# Patient Record
Sex: MALE | Race: WHITE | NOT HISPANIC OR LATINO | URBAN - METROPOLITAN AREA
[De-identification: names, ages, dates, MRNs, and addresses within clinical notes are randomized per-mention and may not be internally consistent; named-entity substitution may affect disease eponyms.]

---

## 2021-06-23 ENCOUNTER — TRANSFERRED RECORDS (OUTPATIENT)
Dept: HEALTH INFORMATION MANAGEMENT | Facility: CLINIC | Age: 71
End: 2021-06-23

## 2021-09-02 ENCOUNTER — TRANSFERRED RECORDS (OUTPATIENT)
Dept: HEALTH INFORMATION MANAGEMENT | Facility: CLINIC | Age: 71
End: 2021-09-02

## 2021-12-08 ENCOUNTER — TRANSFERRED RECORDS (OUTPATIENT)
Dept: HEALTH INFORMATION MANAGEMENT | Facility: CLINIC | Age: 71
End: 2021-12-08

## 2022-01-26 ENCOUNTER — TRANSFERRED RECORDS (OUTPATIENT)
Dept: HEALTH INFORMATION MANAGEMENT | Facility: CLINIC | Age: 72
End: 2022-01-26
Payer: COMMERCIAL

## 2022-01-27 ENCOUNTER — TRANSFERRED RECORDS (OUTPATIENT)
Dept: HEALTH INFORMATION MANAGEMENT | Facility: CLINIC | Age: 72
End: 2022-01-27
Payer: COMMERCIAL

## 2022-01-28 NOTE — TELEPHONE ENCOUNTER
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

## 2022-02-10 ENCOUNTER — TELEPHONE (OUTPATIENT)
Dept: OPHTHALMOLOGY | Facility: CLINIC | Age: 72
End: 2022-02-10
Payer: COMMERCIAL

## 2022-02-10 NOTE — TELEPHONE ENCOUNTER
Spoke with patient regarding scheduling for a sooner appointment that opened up on 2/14/2022. Patient was able to reschedule as offered. Patient is aware of time, date and location.-Per Patient

## 2022-02-11 NOTE — TELEPHONE ENCOUNTER
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

## 2022-02-14 ENCOUNTER — TELEPHONE (OUTPATIENT)
Dept: OPHTHALMOLOGY | Facility: CLINIC | Age: 72
End: 2022-02-14

## 2022-02-14 ENCOUNTER — OFFICE VISIT (OUTPATIENT)
Dept: OPHTHALMOLOGY | Facility: CLINIC | Age: 72
End: 2022-02-14
Payer: COMMERCIAL

## 2022-02-14 ENCOUNTER — PRE VISIT (OUTPATIENT)
Dept: OPHTHALMOLOGY | Facility: CLINIC | Age: 72
End: 2022-02-14

## 2022-02-14 VITALS — WEIGHT: 180 LBS | HEIGHT: 66 IN | BODY MASS INDEX: 28.93 KG/M2

## 2022-02-14 DIAGNOSIS — Q10.3 CANTHAL DYSTOPIA: Primary | ICD-10-CM

## 2022-02-14 PROCEDURE — 99204 OFFICE O/P NEW MOD 45 MIN: CPT | Mod: GC | Performed by: OPHTHALMOLOGY

## 2022-02-14 RX ORDER — MOXIFLOXACIN 5 MG/ML
1 SOLUTION/ DROPS OPHTHALMIC 4 TIMES DAILY
Qty: 5 ML | Refills: 1 | Status: SHIPPED | OUTPATIENT
Start: 2022-02-14

## 2022-02-14 RX ORDER — TIMOLOL MALEATE 5 MG/ML
SOLUTION/ DROPS OPHTHALMIC
COMMUNITY
Start: 2022-01-26

## 2022-02-14 ASSESSMENT — TONOMETRY
IOP_METHOD: TONOPEN
OD_IOP_MMHG: PROS
OS_IOP_MMHG: 17

## 2022-02-14 ASSESSMENT — SLIT LAMP EXAM - LIDS
COMMENTS: NORMAL
COMMENTS: LATERAL CANTHAL DYSTOPIA

## 2022-02-14 ASSESSMENT — VISUAL ACUITY
OD_CC: PROS
OS_CC+: -1
CORRECTION_TYPE: GLASSES
METHOD: SNELLEN - LINEAR
OS_CC: 20/20

## 2022-02-14 ASSESSMENT — CONF VISUAL FIELD
OS_NORMAL: 1
OD_INFERIOR_TEMPORAL_RESTRICTION: 1
OD_SUPERIOR_TEMPORAL_RESTRICTION: 1
OD_INFERIOR_NASAL_RESTRICTION: 1
OD_SUPERIOR_NASAL_RESTRICTION: 1
METHOD: COUNTING FINGERS

## 2022-02-14 ASSESSMENT — EXTERNAL EXAM - LEFT EYE: OS_EXAM: NORMAL

## 2022-02-14 ASSESSMENT — MIFFLIN-ST. JEOR: SCORE: 1524.22

## 2022-02-14 NOTE — TELEPHONE ENCOUNTER
Met with patient to schedule surgery with Dr. Rio Burnham.    Surgery was scheduled on 03/16 at [facility]  Patient will have H&P at [facility]      Patient is aware a COVID-19 test is needed before their procedure. The test should be with-in 4 days of their procedure.   Test Details: Patient will schedule at [facility]     Post-Op visit was scheduled on 04/04  Patient is aware a / is needed day of surgery.   Surgery packet was given, patient has my direct contact information for any further questions.

## 2022-02-14 NOTE — PROGRESS NOTES
Chief Complaints and History of Present Illnesses   Patient presents with     Consult For     S/p Trouble with prosthetic staying in socket.     Chief Complaint(s) and History of Present Illness(es)     Consult For     In right eye.  Associated symptoms include discharge and tearing.    Negative for eye pain and itching.  Pain was noted as 0/10. Additional   comments: S/p Trouble with prosthetic staying in socket.              Comments     S/P Right lateral cathopexy with lateral Myocutneous flap right eye   Jan.2008. Patient reports that the  past month trouble prosthetic right   eye staying in.   Tried to wipe some mucus away right eye and it came out. Could not get   back in.   Had a appt with [facility] for glaucoma/Cataracts left eye and he tried to   help get prosthetic back in right eye and unable.  Went to have prosthetic check and they had  to grind the prothetic down   more to get it in. It was suggested that evaluation be done to see if   something can be done to improve things so that a better fitting eye could   be make that might stay in better.     Sonia Milligan, COT COT 10:50 AM February 14, 2022                      Assessment & Plan     Varinder Fernandez is a 71 year old male with the following diagnoses:   1. Canthal dystopia         S/P Right lateral cathopexy with lateral Myocutneous flap right eye   Jan.2008.  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 His prosthesis fell out 2 months ago and he was unable to replace his prosthesis due to lid position.  - significant white mucopuruent discharge in right socket.  - start vigamox QID right eye until sensitivities return    Plan:  - vigamox QID right eye  - right lateral canthoplasty + buccal mucosal graft to recreate inferior fornix/lateral canthus/superolateral fornix      Silke Negron MD  Oculoplastics Fellow    Attending Physician Attestation:  I have seen and examined this patient with the  fellow .  I have confirmed and edited as necessary the chief complaint(s), history of present illness, review of systems, relevant history, and examination findings as documented by others.  I have personally reviewed the relevant tests, images, and reports as documented above.  I have confirmed and edited as necessary the assessment and plan and agree with this note.    - Rio Burnham MD 11:18 AM 2/14/2022    Today with Varinder Fernandez, I reviewed the indications, risks, benefits, and alternatives of the proposed surgical procedure including, but not limited to, failure obtain the desired result  and need for additional surgery, bleeding, infection, loss of vision, loss of the eye, and the remote possibility of permanent damage to any organ system or death with the use of anesthesia.  I provided multiple opportunities for the questions, answered all questions to the best of my ability, and confirmed that my answers and my discussion were understood.     - Rio Burnham MD 11:18 AM 2/14/2022

## 2022-02-14 NOTE — NURSING NOTE
Chief Complaints and History of Present Illnesses   Patient presents with     Consult For     S/p Trouble with prosthetic staying in socket.     Chief Complaint(s) and History of Present Illness(es)     Consult For     Laterality: right eye    Associated symptoms: discharge and tearing.  Negative for eye pain and itching    Pain scale: 0/10    Comments: S/p Trouble with prosthetic staying in socket.              Comments     S/P Right lateral cathopexy with lateral Myocutneous flap right eye Jan.2008. Patient reports that the  past month trouble prosthetic right eye staying in.   Tried to wipe some mucus away right eye and it came out. Could not get back in.   Had a appt with [employer] for glaucoma/Cataracts left eye and he tried to help get prosthetic back in right eye and unable.  Went to have prosthetic check and they had  to grind the prothetic down more to get it in. It was suggested that evaluation be done to see if something can be done to improve things so that a better fitting eye could be make that might stay in better.     Sonia Milligan, COT COT 10:50 AM February 14, 2022

## 2022-02-15 DIAGNOSIS — Z11.59 ENCOUNTER FOR SCREENING FOR OTHER VIRAL DISEASES: Primary | ICD-10-CM

## 2022-02-18 ENCOUNTER — PRE VISIT (OUTPATIENT)
Dept: OPHTHALMOLOGY | Facility: CLINIC | Age: 72
End: 2022-02-18

## 2022-03-15 ENCOUNTER — ANESTHESIA EVENT (OUTPATIENT)
Dept: SURGERY | Facility: AMBULATORY SURGERY CENTER | Age: 72
End: 2022-03-15
Payer: COMMERCIAL

## 2022-03-15 RX ORDER — FENTANYL CITRATE 50 UG/ML
25 INJECTION, SOLUTION INTRAMUSCULAR; INTRAVENOUS
Status: CANCELLED | OUTPATIENT
Start: 2022-03-15

## 2022-03-16 ENCOUNTER — ANESTHESIA (OUTPATIENT)
Dept: SURGERY | Facility: AMBULATORY SURGERY CENTER | Age: 72
End: 2022-03-16
Payer: COMMERCIAL

## 2022-03-16 ENCOUNTER — HOSPITAL ENCOUNTER (OUTPATIENT)
Facility: AMBULATORY SURGERY CENTER | Age: 72
Discharge: HOME | End: 2022-03-16
Attending: OPHTHALMOLOGY
Payer: COMMERCIAL

## 2022-03-16 VITALS
OXYGEN SATURATION: 97 % | HEART RATE: 75 BPM | DIASTOLIC BLOOD PRESSURE: 79 MMHG | BODY MASS INDEX: 27.78 KG/M2 | RESPIRATION RATE: 16 BRPM | WEIGHT: 177 LBS | TEMPERATURE: 96.9 F | SYSTOLIC BLOOD PRESSURE: 118 MMHG | HEIGHT: 67 IN

## 2022-03-16 DIAGNOSIS — Q10.3 CANTHAL DYSTOPIA: ICD-10-CM

## 2022-03-16 DIAGNOSIS — H11.231 SYMBLEPHARON OF RIGHT EYE: Primary | ICD-10-CM

## 2022-03-16 PROCEDURE — 67875 CLOSURE OF EYELID BY SUTURE: CPT | Mod: RT | Performed by: OPHTHALMOLOGY

## 2022-03-16 PROCEDURE — 68328 REVISE/GRAFT EYELID LINING: CPT | Mod: RT | Performed by: OPHTHALMOLOGY

## 2022-03-16 PROCEDURE — 21282 LATERAL CANTHOPEXY: CPT | Mod: RT | Performed by: OPHTHALMOLOGY

## 2022-03-16 PROCEDURE — 67875 CLOSURE OF EYELID BY SUTURE: CPT | Mod: RT

## 2022-03-16 PROCEDURE — 68328 REVISE/GRAFT EYELID LINING: CPT | Mod: RT

## 2022-03-16 PROCEDURE — 21282 LATERAL CANTHOPEXY: CPT | Mod: RT

## 2022-03-16 RX ORDER — FENTANYL CITRATE 50 UG/ML
25 INJECTION, SOLUTION INTRAMUSCULAR; INTRAVENOUS EVERY 5 MIN PRN
Status: DISCONTINUED | OUTPATIENT
Start: 2022-03-16 | End: 2022-03-18 | Stop reason: HOSPADM

## 2022-03-16 RX ORDER — OXYCODONE HYDROCHLORIDE 5 MG/1
5 TABLET ORAL EVERY 6 HOURS PRN
Qty: 12 TABLET | Refills: 0 | Status: SHIPPED | OUTPATIENT
Start: 2022-03-16 | End: 2022-03-19

## 2022-03-16 RX ORDER — LIDOCAINE 40 MG/G
CREAM TOPICAL
Status: DISCONTINUED | OUTPATIENT
Start: 2022-03-16 | End: 2022-03-16 | Stop reason: HOSPADM

## 2022-03-16 RX ORDER — CHLORHEXIDINE GLUCONATE ORAL RINSE 1.2 MG/ML
15 SOLUTION DENTAL 2 TIMES DAILY
Qty: 200 ML | Refills: 0 | Status: SHIPPED | OUTPATIENT
Start: 2022-03-16

## 2022-03-16 RX ORDER — SODIUM CHLORIDE, SODIUM LACTATE, POTASSIUM CHLORIDE, CALCIUM CHLORIDE 600; 310; 30; 20 MG/100ML; MG/100ML; MG/100ML; MG/100ML
INJECTION, SOLUTION INTRAVENOUS CONTINUOUS
Status: DISCONTINUED | OUTPATIENT
Start: 2022-03-16 | End: 2022-03-16 | Stop reason: HOSPADM

## 2022-03-16 RX ORDER — CHLORHEXIDINE GLUCONATE ORAL RINSE 1.2 MG/ML
SOLUTION DENTAL PRN
Status: DISCONTINUED | OUTPATIENT
Start: 2022-03-16 | End: 2022-03-16 | Stop reason: HOSPADM

## 2022-03-16 RX ORDER — SODIUM CHLORIDE, SODIUM LACTATE, POTASSIUM CHLORIDE, CALCIUM CHLORIDE 600; 310; 30; 20 MG/100ML; MG/100ML; MG/100ML; MG/100ML
INJECTION, SOLUTION INTRAVENOUS CONTINUOUS PRN
Status: DISCONTINUED | OUTPATIENT
Start: 2022-03-16 | End: 2022-03-16

## 2022-03-16 RX ORDER — FENTANYL CITRATE 50 UG/ML
INJECTION, SOLUTION INTRAMUSCULAR; INTRAVENOUS PRN
Status: DISCONTINUED | OUTPATIENT
Start: 2022-03-16 | End: 2022-03-16

## 2022-03-16 RX ORDER — ACETAMINOPHEN 325 MG/1
975 TABLET ORAL ONCE
Status: DISCONTINUED | OUTPATIENT
Start: 2022-03-16 | End: 2022-03-16 | Stop reason: HOSPADM

## 2022-03-16 RX ORDER — PROPOFOL 10 MG/ML
INJECTION, EMULSION INTRAVENOUS PRN
Status: DISCONTINUED | OUTPATIENT
Start: 2022-03-16 | End: 2022-03-16

## 2022-03-16 RX ORDER — LIDOCAINE HYDROCHLORIDE 20 MG/ML
INJECTION, SOLUTION INFILTRATION; PERINEURAL PRN
Status: DISCONTINUED | OUTPATIENT
Start: 2022-03-16 | End: 2022-03-16

## 2022-03-16 RX ORDER — PROPOFOL 10 MG/ML
INJECTION, EMULSION INTRAVENOUS CONTINUOUS PRN
Status: DISCONTINUED | OUTPATIENT
Start: 2022-03-16 | End: 2022-03-16

## 2022-03-16 RX ORDER — MEPERIDINE HYDROCHLORIDE 25 MG/ML
12.5 INJECTION INTRAMUSCULAR; INTRAVENOUS; SUBCUTANEOUS
Status: DISCONTINUED | OUTPATIENT
Start: 2022-03-16 | End: 2022-03-18 | Stop reason: HOSPADM

## 2022-03-16 RX ORDER — SODIUM CHLORIDE, SODIUM LACTATE, POTASSIUM CHLORIDE, CALCIUM CHLORIDE 600; 310; 30; 20 MG/100ML; MG/100ML; MG/100ML; MG/100ML
INJECTION, SOLUTION INTRAVENOUS CONTINUOUS
Status: DISCONTINUED | OUTPATIENT
Start: 2022-03-16 | End: 2022-03-18 | Stop reason: HOSPADM

## 2022-03-16 RX ORDER — ONDANSETRON 2 MG/ML
INJECTION INTRAMUSCULAR; INTRAVENOUS PRN
Status: DISCONTINUED | OUTPATIENT
Start: 2022-03-16 | End: 2022-03-16

## 2022-03-16 RX ORDER — LIDOCAINE HYDROCHLORIDE AND EPINEPHRINE 10; 10 MG/ML; UG/ML
INJECTION, SOLUTION INFILTRATION; PERINEURAL PRN
Status: DISCONTINUED | OUTPATIENT
Start: 2022-03-16 | End: 2022-03-16 | Stop reason: HOSPADM

## 2022-03-16 RX ORDER — DEXAMETHASONE SODIUM PHOSPHATE 4 MG/ML
INJECTION, SOLUTION INTRA-ARTICULAR; INTRALESIONAL; INTRAMUSCULAR; INTRAVENOUS; SOFT TISSUE PRN
Status: DISCONTINUED | OUTPATIENT
Start: 2022-03-16 | End: 2022-03-16

## 2022-03-16 RX ORDER — ONDANSETRON 2 MG/ML
4 INJECTION INTRAMUSCULAR; INTRAVENOUS EVERY 30 MIN PRN
Status: DISCONTINUED | OUTPATIENT
Start: 2022-03-16 | End: 2022-03-18 | Stop reason: HOSPADM

## 2022-03-16 RX ORDER — CEFAZOLIN SODIUM 1 G/3ML
INJECTION, POWDER, FOR SOLUTION INTRAMUSCULAR; INTRAVENOUS PRN
Status: DISCONTINUED | OUTPATIENT
Start: 2022-03-16 | End: 2022-03-16

## 2022-03-16 RX ORDER — ERYTHROMYCIN 5 MG/G
OINTMENT OPHTHALMIC PRN
Status: DISCONTINUED | OUTPATIENT
Start: 2022-03-16 | End: 2022-03-16 | Stop reason: HOSPADM

## 2022-03-16 RX ORDER — CEPHALEXIN 500 MG/1
500 CAPSULE ORAL 2 TIMES DAILY
Qty: 20 CAPSULE | Refills: 0 | Status: SHIPPED | OUTPATIENT
Start: 2022-03-16 | End: 2022-03-26

## 2022-03-16 RX ORDER — ONDANSETRON 4 MG/1
4 TABLET, ORALLY DISINTEGRATING ORAL EVERY 30 MIN PRN
Status: DISCONTINUED | OUTPATIENT
Start: 2022-03-16 | End: 2022-03-18 | Stop reason: HOSPADM

## 2022-03-16 RX ORDER — NEOMYCIN POLYMYXIN B SULFATES AND DEXAMETHASONE 3.5; 10000; 1 MG/ML; [USP'U]/ML; MG/ML
1 SUSPENSION/ DROPS OPHTHALMIC 4 TIMES DAILY
Qty: 3 ML | Refills: 0 | Status: SHIPPED | OUTPATIENT
Start: 2022-03-16

## 2022-03-16 RX ORDER — DIPHENHYDRAMINE HYDROCHLORIDE AND LIDOCAINE HYDROCHLORIDE AND ALUMINUM HYDROXIDE AND MAGNESIUM HYDRO
5-10 KIT EVERY 6 HOURS PRN
Qty: 119 ML | Refills: 0 | Status: SHIPPED | OUTPATIENT
Start: 2022-03-16

## 2022-03-16 RX ORDER — ERYTHROMYCIN 5 MG/G
OINTMENT OPHTHALMIC
Qty: 3.5 G | Refills: 0 | Status: SHIPPED | OUTPATIENT
Start: 2022-03-16

## 2022-03-16 RX ORDER — HYDROMORPHONE HYDROCHLORIDE 1 MG/ML
0.2 INJECTION, SOLUTION INTRAMUSCULAR; INTRAVENOUS; SUBCUTANEOUS EVERY 5 MIN PRN
Status: DISCONTINUED | OUTPATIENT
Start: 2022-03-16 | End: 2022-03-18 | Stop reason: HOSPADM

## 2022-03-16 RX ORDER — OXYCODONE HYDROCHLORIDE 5 MG/1
5 TABLET ORAL EVERY 4 HOURS PRN
Status: DISCONTINUED | OUTPATIENT
Start: 2022-03-16 | End: 2022-03-18 | Stop reason: HOSPADM

## 2022-03-16 RX ADMIN — Medication 50 MG: at 10:20

## 2022-03-16 RX ADMIN — SODIUM CHLORIDE, SODIUM LACTATE, POTASSIUM CHLORIDE, CALCIUM CHLORIDE: 600; 310; 30; 20 INJECTION, SOLUTION INTRAVENOUS at 08:45

## 2022-03-16 RX ADMIN — Medication 100 MCG: at 10:57

## 2022-03-16 RX ADMIN — Medication 100 MCG: at 11:11

## 2022-03-16 RX ADMIN — ONDANSETRON 4 MG: 2 INJECTION INTRAMUSCULAR; INTRAVENOUS at 10:44

## 2022-03-16 RX ADMIN — PROPOFOL 150 MCG/KG/MIN: 10 INJECTION, EMULSION INTRAVENOUS at 10:19

## 2022-03-16 RX ADMIN — CEFAZOLIN SODIUM 2 G: 1 INJECTION, POWDER, FOR SOLUTION INTRAMUSCULAR; INTRAVENOUS at 10:30

## 2022-03-16 RX ADMIN — PROPOFOL 150 MG: 10 INJECTION, EMULSION INTRAVENOUS at 10:19

## 2022-03-16 RX ADMIN — DEXAMETHASONE SODIUM PHOSPHATE 4 MG: 4 INJECTION, SOLUTION INTRA-ARTICULAR; INTRALESIONAL; INTRAMUSCULAR; INTRAVENOUS; SOFT TISSUE at 10:30

## 2022-03-16 RX ADMIN — SODIUM CHLORIDE, SODIUM LACTATE, POTASSIUM CHLORIDE, CALCIUM CHLORIDE: 600; 310; 30; 20 INJECTION, SOLUTION INTRAVENOUS at 10:14

## 2022-03-16 RX ADMIN — LIDOCAINE HYDROCHLORIDE 60 MG: 20 INJECTION, SOLUTION INFILTRATION; PERINEURAL at 10:19

## 2022-03-16 RX ADMIN — FENTANYL CITRATE 50 MCG: 50 INJECTION, SOLUTION INTRAMUSCULAR; INTRAVENOUS at 10:19

## 2022-03-16 ASSESSMENT — LIFESTYLE VARIABLES: TOBACCO_USE: 0

## 2022-03-16 ASSESSMENT — ENCOUNTER SYMPTOMS: ORTHOPNEA: 0

## 2022-03-16 ASSESSMENT — COPD QUESTIONNAIRES: COPD: 0
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
## 2022-03-16 NOTE — BRIEF OP NOTE
[facility]    Brief Operative Note    Pre-operative diagnosis: Canthal dystopia [Q10.3]          Post-operative diagnosis Same as pre-operative diagnosis    Procedure: Procedure(s):  right lateral canthoplasty, buccal mucosal graft  Surgeon: Surgeon(s) and Role:     * Rio Burnham MD - Primary  Anesthesia: General   Estimated Blood Loss: Minimal    Drains: None  Specimens: * No specimens in log *  Findings:   as expected.  Complications: None.  Implants: * No implants in log *

## 2022-03-16 NOTE — OP NOTE
PREOPERATIVE DIAGNOSES:     1.  Right anophthalmos.    2.  Severe forniceal contracture, symblepharon, and ankyloblepharon.       POSTOPERATIVE DIAGNOSES:   1.  Right anophthalmos.    2.  Severe forniceal contracture, symblepharon, and ankyloblepharon.         PROCEDURES PERFORMED:   1.  Right conjunctivoplasty with recreation of inferior, medial and lateral fornices.   2.  Right lateral canthopexy.   3.  Oral mucous membrane graft.   4.  Temporary tarsorrhaphy.       SURGEON:  Rio Burnham MD      ASSISTANT: Sandra Choudhary MD      ANESTHESIA:  General and local infiltration of 1% lidocaine with epinephrine       BLOOD LOSS:  5 mL       COMPLICATIONS:  None.       INDICATIONS FOR PROCEDURE: @ presented with severe forniceal contracture and has been unable to retain a prosthesis.  We discussed risks, benefits, alternatives to the procedure. Varinder Fernandez elected to proceed.      DESCRIPTION OF PROCEDURE: Varinder Fernandez was brought to the operating room, placed supine on the operating table. Under general anesthesia, The right upper and lower eyelid and conjunctivae were infiltrated with local anesthetic as was the oral mucosa of the cheek.   The patient was prepped and draped in the usual sterile fashion for oculoplastic surgery. Lateral canthus was incised with 15 blade. Dissection was carried down to the periosteum with cautery. The upper and lower lids were released with the Dimitrios scissors.  There was no fornix and the eyelid was fused to what would be the bulbar conjunctiva. Sharp dissection with Dimitrios scissors was used to separate recreate a fornix and separate the eyelid from what should be the bulbar conjunctiva.   A small conformer was placed in and once adequate size of the superior and inferior fornix was obtained, attention was directed to a lateral canthotomy. The lateral canthotomy was performed with the Dimitrios scissors and inferior and superior cantholysis was also performed.  Next,  attention was directed to harvesting of the oral mucous membrane graft.  A decision was made to line the fornix with the oral mucous membrane graft. The oral mucosa had been infiltrated with local anesthetic previously and saline-soaked gauze and the clamp was placed over the cheek. The oral mucosa was marked out with a marking pen in an ellipse fashion.  Nidia's duct was identified and avoided. A #10 blade was used to incise the oral mucosa.  Sadie scissors were used to dissect out the graft.  Hemostasis was obtained with monopolar cautery and Gelfoam was placed over the defect for the remainder of the case.  Attention was directed back to the eye socket. Again there was very little true conjunctiva left. The graft was then secured to the superior edge of the remnant tarsal conjunctiva with a 6-0 chromic interrupted and running fashion the other edge was secured to what should be the bulbar conjunctiva, again with 6-0 chromic. The conformer was again placed and it was found to fit very nicely now.  Attention was directed back to the lateral canthal angle.  The lateral upper and lower  was secured to the lateral orbital rim in the area of [place]nall tubercle in a mattress fashion with a 6-0 Vicryl. Tying this down put the lower lid in a better position. Tying this down formed a nice sharp lateral canthal angle.  It also acted as a temporary tarsorrhaphy. A 4-0 prolene suture over telfa bolsters was used to fashion a temporary tarsorrhaphy. Erythromycin ophthalmic ointment was applied.  Attention was directed back to the mouth.  The Surgicel was removed. Varinder Fernandez tolerated the procedure well and was extubated and returned to the postop area in stable condition.         THOR TOPETE MD

## 2022-03-16 NOTE — DISCHARGE INSTRUCTIONS
Post-operative Instructions    Ophthalmic Plastic and Reconstructive Surgery  Rio Burnham M.D.  Silke Negron M.D.    All instructions apply to the operated eye(s) or eyelid(s)    What to expect after surgery:    There will be some swelling, bruising, and likely a black eye (even into the lower eyelids and cheeks). Also expect crusting and discharge from the eye and/or incisions.     A small amount of surface bleeding is normal for the first 48 hours after surgery.    You may notice some bloody tears for the first few days after surgery. This is normal.    Your eye(s) and eyelid(s) may be painful and tender. This is normal after surgery. Use the pain medication as prescribed. If your pain does not improve despite the medication, contact the office.    Wound care and personal care:    If a patch or bandage has been placed, please leave this in place until seen in clinic. Prevent the bandage from getting wet.     Apply ice compresses 15 minutes on 15 minutes off while awake for the first 2 days after surgery, then switch to warm compresses 4 times a day until seen by your physician.     For warm packs you can place a cup of dry uncooked rice in a clean cotton sock. Place sock in microwave 30 seconds to one minute. Next place the warm sock into a plastic bag and wrap the bag with clean warm wet washcloth and place over operated eye.      You may shower or wash your hair the day after surgery. Do not bathe or go swimming for 1 week to prevent contamination of your wounds.    Do not apply make-up to the eyes or eyelids for 2 weeks after surgery.    Activity restrictions and driving:    Avoid heavy lifting, bending, exercise or strenuous activity for 1 week after surgery.    You may resume other activities and return to work as tolerated.    You may not resume driving until have you stopped using narcotic pain medications(such as Norco, Percocet, Tylenol #3).    Medications:    Restart all your regular home  medications and eye drops today. If you take Plavix or Aspirin on a regular basis, wait for 3 days after your surgery before restarting these in order to decrease the risk of bleeding complications.    Avoid aspirin and aspirin-like medications (Motrin, Aleve, Ibuprofen, [place] etc) for 5 days to reduce the risk of bleeding. You may take Tylenol (acetaminophen) for pain.    In addition to your home medications, take the following post-operative medications as prescribed by your physician:    Apply antibiotic ointment (erythromycin) to all sutures three times a day, and into the operated eye(s) at night.     Instill eye drops (Maxitrol) four times a day until the bottle finished.     Antibiotic - take as directed     Mouth wash - use as directed    Take scheduled extra strength Tylenol for pain.  You may take 1 to 2 pain pills (norco or oxycodone as prescribed) as needed for breakthrough pain up to every 6 hours.    The pain pills may make you drowsy. You must not drive a car, operate heavy machinery or drink alcohol while taking them.    The pain pills may cause constipation and nausea. Take them with some food to prevent a stomach upset. If you continue to experience nausea, call your physician.      WARNING: All the prescription pain medications listed above contain Tylenol (acetaminophen). You must not take more than 4,000 mg of acetaminophen per 24-hour period. This is equivalent to 6 tablets of Darvocet, 8 tablets of Vicodin, or 12 tablets of Norco, Percocet or Tylenol #3. If you take other over-the-counter medications containing acetaminophen, you must take the amount of acetaminophen into account and reduce the number of prescribed pain pills accordingly.    Contact information and follow-up:    Return to the Eye Clinic for a follow-up appointment with your physician as  scheduled. If no appointment has been scheduled, call [phone] for an  appointment with Dr. Burnham within 1 to 2 weeks from your  date of surgery.  -     Please email a few photos of your eye(s) or other operative site(s) to [email or web address] prior to your follow up visit.      For severe pain, bleeding, or loss of vision, call the Eye Clinic at [phone].    After hours or on weekends and holidays, call [phone] and ask to speak with the ophthalmologist on call.      [facility]  Home Care Following Anesthesia  For 24 hours after surgery:  1. Get plenty of rest.  A responsible adult must stay with you for at least 24 hours after you leave the surgery center.  2. Do not drive or use heavy equipment.  If you have weakness or tingling, don't drive or use heavy equipment until this feeling goes away.   3. Do not drink alcohol.   4. Avoid strenuous or risky activities.  Ask for help when climbing stairs.  5. You may feel lightheaded.  IF so, sit for a few minutes before standing.  Have someone help you get up.   6. If you have nausea (feel sick to your stomach): Drink only clear liquids such as apple juice, ginger ale, broth or 7-Up.  Rest may also help.  Be sure to drink enough fluids.  Move to a regular diet as you feel able.   7. You may have a slight fever.  Call the doctor if your fever is over 100 F (37.7 C) (taken under the tongue) or lasts longer than 24 hours.  8. You may have a dry mouth, a sore throat, muscle aches or trouble sleeping. These should go away after 24 hours.  9. Do not make important or legal decisions.   10. It is recommended to avoid smoking.               Tips for taking pain medications  To get the best pain relief possible, remember these points:    Take pain medications as directed, before pain becomes severe.    Pain medication can upset your stomach: taking it with food may help.    Constipation is a common side effect of pain medication. Drink plenty of  fluids.    Eat foods high in fiber. Take a stool softener if recommended by your doctor or pharmacist.    Do not  drink alcohol, drive or operate machinery while taking pain medications.    Ask about other ways to control pain, such as with heat, ice or relaxation.    Tylenol/Acetaminophen Consumption  To help encourage the safe use of acetaminophen, the makers of TYLENOL  have lowered the maximum daily dose for single-ingredient Extra Strength TYLENOL  (acetaminophen) products sold in the [place] from 8 pills per day (4,000 mg) to 6 pills per day (3,000 mg). The dosing interval has also changed from 2 pills every 4-6 hours to 2 pills every 6 hours.    If you feel your pain relief is insufficient, you may take Tylenol/Acetaminophen in addition to your narcotic pain medication.     Be careful not to exceed 3,000 mg of Tylenol/Acetaminophen in a 24 hour period from all sources.    If you are taking extra strength Tylenol/acetaminophen (500 mg), the maximum dose is 6 tablets in 24 hours.    If you are taking regular strength acetaminophen (325 mg), the maximum dose is 9 tablets in 24 hours.    Call a doctor for any of the followin. Signs of infection (fever, growing tenderness at the surgery site, a large amount of drainage or bleeding, severe pain, foul-smelling drainage, redness, swelling).  2. It has been over 8 to 10 hours since surgery and you are still not able to urinate (pass water).  3. Headache for over 24 hours.  4. Signs of Covid-19 infection (temperature over 100 degrees, shortness of breath, cough, loss of taste/smell, generalized body aches, persistent headache, chills, sore throat, nausea/vomiting/diarrhea)      Your doctor is:  Dr. Rio Burnham, Ophthalmology: [phone] (Monday-Friday 8-4)      Or for After Hours concerns: dial [phone] and ask for the resident on call for:  Ophthalmology  For emergency care, call the:  [facility] [place] [facility]:  [phone] (TTY for hearing impaired: [phone])

## 2022-03-16 NOTE — ANESTHESIA POSTPROCEDURE EVALUATION
Patient: Varinder Fernandez    Procedure: Procedure(s):  right lateral canthoplasty, buccal mucosal graft, temporary tarsorrhaphy       Anesthesia Type:  General    Note:  Disposition: Outpatient   Postop Pain Control: Uneventful            Sign Out: Well controlled pain   PONV: No   Neuro/Psych: Uneventful            Sign Out: Acceptable/Baseline neuro status   Airway/Respiratory: Uneventful            Sign Out: Acceptable/Baseline resp. status   CV/Hemodynamics: Uneventful            Sign Out: Acceptable CV status; No obvious hypovolemia; No obvious fluid overload   Other NRE: NONE   DID A NON-ROUTINE EVENT OCCUR? No           Last vitals:  Vitals Value Taken Time   /70 03/16/22 1145   Temp 36.1  C (96.9  F) 03/16/22 1141   Pulse 83 03/16/22 1148   Resp 13 03/16/22 1148   SpO2 95 % 03/16/22 1148   Vitals shown include unvalidated device data.    Electronically Signed By: Dorothy Flores MD  March 16, 2022  2:55 PM

## 2022-03-16 NOTE — ANESTHESIA CARE TRANSFER NOTE
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    Anesthesia Type:   General     Note:      Level of Consciousness: awake  Oxygen Supplementation: face mask    Independent Airway: airway patency satisfactory and stable        Patient transferred to: PACU    Handoff Report: Identifed the Patient, Identified the Reponsible Provider, Reviewed the pertinent medical history, Discussed the surgical course, Reviewed Intra-OP anesthesia mangement and issues during anesthesia, Set expectations for post-procedure period and Allowed opportunity for questions and acknowledgement of understanding      Vitals:  Vitals Value Taken Time   /86 03/16/22 1140   Temp 36.1  C (96.9  F) 03/16/22 1141   Pulse 80 03/16/22 1142   Resp 17 03/16/22 1142   SpO2 99 % 03/16/22 1142   Vitals shown include unvalidated device data.    Electronically Signed By: NICHOLE Greco CRNA  March 16, 2022  11:43 AM

## 2022-04-04 ENCOUNTER — OFFICE VISIT (OUTPATIENT)
Dept: OPHTHALMOLOGY | Facility: CLINIC | Age: 72
End: 2022-04-04
Payer: COMMERCIAL

## 2022-04-04 VITALS — HEIGHT: 66 IN | BODY MASS INDEX: 28.12 KG/M2 | WEIGHT: 175 LBS

## 2022-04-04 DIAGNOSIS — Q11.1 ANOPHTHALMOS OF RIGHT EYE: ICD-10-CM

## 2022-04-04 DIAGNOSIS — Q10.3 CANTHAL DYSTOPIA: ICD-10-CM

## 2022-04-04 DIAGNOSIS — Z98.890 POST-OPERATIVE STATE: Primary | ICD-10-CM

## 2022-04-04 PROCEDURE — 99024 POSTOP FOLLOW-UP VISIT: CPT | Mod: GC | Performed by: OPHTHALMOLOGY

## 2022-04-04 ASSESSMENT — TONOMETRY
OS_IOP_MMHG: 17
OD_IOP_MMHG: -
IOP_METHOD: ICARE

## 2022-04-04 ASSESSMENT — VISUAL ACUITY
OD_CC: -
OS_CC: 20/20
METHOD: SNELLEN - LINEAR

## 2022-04-04 ASSESSMENT — EXTERNAL EXAM - LEFT EYE: OS_EXAM: NORMAL

## 2022-04-04 ASSESSMENT — SLIT LAMP EXAM - LIDS: COMMENTS: NORMAL

## 2022-04-04 NOTE — PROGRESS NOTES
Chief Complaints and History of Present Illnesses   Patient presents with     Follow Up     S/P right lateral canthoplasty, buccal mucosal graft, temporary tarsorrhaphy 3/16/22     Chief Complaint(s) and History of Present Illness(es)     Follow Up     In right eye.  Associated symptoms include tearing.  Negative for eye   pain, discharge and swelling.  Treatments tried include ointment.  Pain   was noted as 0/10. Additional comments: S/P right lateral canthoplasty,   buccal mucosal graft, temporary tarsorrhaphy 3/16/22              Comments     Patient reports that he is doing fine sine the procedure. No pain or   discharge issues.    Has been compliance with drops and ointment right eye/ Uses Limotic BID   left eye.     Good compliance with eye medication.       Sonia Milligan, COT COT 10:16 AM April 4, 2022        Mr. Fernandez is a 71 year old man presenting for post-op after right lateral canthoplasty with buccal mucosal graft and temporary tarsorrhaphy on 3/16/22 by Dr. Burnham. He has not had any significant pain or discharge. He is concerned about the appearance of the outer corner of the eye.          Assessment & Plan     Varinder Fernandez is a 71 year old male with the following diagnoses:   1. Post-operative state    2. Canthal dystopia    3. Anophthalmos of right eye       - s/p right conjunctivoplasty with recreation of the inferior, medial, and lateral fornices using an oral mucous membrane fragt, right lateral canthopexy, and temporary tarsorrhaphy on 3/16/22 with Dr. Burnham.  - used mouth wash, mouth feeling better  - removed tarsorrhaphy today  - Continue maxitrol drops in the right eye  - follow up in 2 months for repeat check    Sandra Choudhary MD  Ophthalmology Resident, PGY-2  [facility]            Attending Physician Attestation:  I have seen and examined this patient with the resident .  I have confirmed and edited as necessary the chief complaint(s), history of present illness, review  of systems, relevant history, and examination findings as documented by others.  I have personally reviewed the relevant tests, images, and reports as documented above.  I have confirmed and edited as necessary the assessment and plan and agree with this note.    - Rio Burnham MD 10:36 AM 4/4/2022

## 2022-04-04 NOTE — NURSING NOTE
Chief Complaints and History of Present Illnesses   Patient presents with     Follow Up     S/P right lateral canthoplasty, buccal mucosal graft, temporary tarsorrhaphy 3/16/22     Chief Complaint(s) and History of Present Illness(es)     Follow Up     Laterality: right eye    Associated symptoms: tearing.  Negative for eye pain, discharge and swelling    Treatments tried: ointment    Pain scale: 0/10    Comments: S/P right lateral canthoplasty, buccal mucosal graft, temporary tarsorrhaphy 3/16/22              Comments     Patient reports that he is doing fine sine the procedure. No pain or discharge issues.    Has been compliance with drops and ointment right eye/ Uses Limotic BID left eye.     Good compliance with eye medication.       DOROTHEA Malcolm 10:16 AM April 4, 2022

## 2022-06-06 ENCOUNTER — OFFICE VISIT (OUTPATIENT)
Dept: OPHTHALMOLOGY | Facility: CLINIC | Age: 72
End: 2022-06-06
Payer: COMMERCIAL

## 2022-06-06 DIAGNOSIS — Z98.890 POSTOPERATIVE EYE STATE: Primary | ICD-10-CM

## 2022-06-06 PROCEDURE — 99024 POSTOP FOLLOW-UP VISIT: CPT | Performed by: OPHTHALMOLOGY

## 2022-06-06 ASSESSMENT — VISUAL ACUITY
OS_SC+: -1
OS_SC: 20/25
METHOD: SNELLEN - LINEAR
OD_SC: PROS

## 2022-06-06 ASSESSMENT — EXTERNAL EXAM - LEFT EYE: OS_EXAM: NORMAL

## 2022-06-06 ASSESSMENT — TONOMETRY
OS_IOP_MMHG: 13
IOP_METHOD: ICARE
OD_IOP_MMHG: PROS

## 2022-06-06 ASSESSMENT — SLIT LAMP EXAM - LIDS: COMMENTS: NORMAL

## 2022-06-06 NOTE — NURSING NOTE
Chief Complaints and History of Present Illnesses   Patient presents with     Post Op (Ophthalmology) Right Eye     Post right conjunctivoplasty with recreation of the inferior, medial, and lateral fornices using an oral mucous membrane fragt, right lateral canthopexy, and temporary tarsorrhaphy on 3/16/22      Chief Complaint(s) and History of Present Illness(es)     Post Op (Ophthalmology) Right Eye     Laterality: right eye    Course: gradually improving    Associated symptoms: discharge.  Negative for dryness, eye pain and tearing    Treatments tried: eye drops (Drops for left eye)    Pain scale: 0/10    Comments: Post right conjunctivoplasty with recreation of the inferior, medial, and lateral fornices using an oral mucous membrane fragt, right lateral canthopexy, and temporary tarsorrhaphy on 3/16/22               Comments     Patient states that his right eye is doing well.  Patient denies having any eye discomfort.    DOROTHEA Alcantar 9:29 AM  June 6, 2022

## 2022-06-06 NOTE — PROGRESS NOTES
Chief Complaints and History of Present Illnesses   Patient presents with     Post Op (Ophthalmology) Right Eye     Post right conjunctivoplasty with recreation of the inferior, medial, and lateral fornices using an oral mucous membrane fragt, right lateral canthopexy, and temporary tarsorrhaphy on 3/16/22      Chief Complaint(s) and History of Present Illness(es)     Post Op (Ophthalmology) Right Eye     In right eye.  Since onset it is gradually improving.  Associated   symptoms include discharge.  Negative for dryness, eye pain and tearing.    Treatments tried include eye drops (Drops for left eye).  Pain was noted   as 0/10. Additional comments: Post right conjunctivoplasty with recreation   of the inferior, medial, and lateral fornices using an oral mucous   membrane fragt, right lateral canthopexy, and temporary tarsorrhaphy on   3/16/22          Comments     Patient states that his right eye is doing well.  Patient denies having   any eye discomfort.    DOROTHEA Alcantar 9:29 AM  June 6, 2022              Assessment & Plan     Varinder Fernandez is a 71 year old male with the following diagnoses:   1. Postoperative eye state       Cont lubrication with artificial tears twice a day   Will get new prosthesis when financially possible  Return to clinic 3 months            Attending Physician Attestation:  Complete documentation of historical and exam elements from today's encounter can be found in the full encounter summary report (not reduplicated in this progress note).  I personally obtained the chief complaint(s) and history of present illness.  I confirmed and edited as necessary the review of systems, past medical/surgical history, family history, social history, and examination findings as documented by others; and I examined the patient myself.  I personally reviewed the relevant tests, images, and reports as documented above.  I formulated and edited as necessary the assessment and plan and discussed the  findings and management plan with the patient and family. I personally reviewed the ophthalmic test(s) associated with this encounter, and have completed the corresponding report(s) as necessary.   -Rio Burnham MD

## 2022-06-28 ENCOUNTER — TRANSFERRED RECORDS (OUTPATIENT)
Dept: HEALTH INFORMATION MANAGEMENT | Facility: CLINIC | Age: 72
End: 2022-06-28

## (undated) DEVICE — LINEN TOWEL PACK X5 5464

## (undated) DEVICE — PACK MINOR EYE CUSTOM ASC

## (undated) DEVICE — EYE CONFORMER SM S6.2230U

## (undated) DEVICE — EYE PREP BETADINE 5% SOLUTION 30ML 0065-0411-30

## (undated) DEVICE — ESU ELEC NDL 1" COATED/INSULATED E1465

## (undated) DEVICE — ESU GROUND PAD ADULT W/CORD E7507

## (undated) DEVICE — BLADE KNIFE SURG 15 371115

## (undated) DEVICE — ESU PENCIL W/COATED BLADE E2450H

## (undated) DEVICE — GLOVE PROTEXIS MICRO 7.5  2D73PM75

## (undated) DEVICE — SPONGE RAY-TEC 4X8" 7318

## (undated) DEVICE — SOL NACL 0.9% IRRIG 500ML BOTTLE 2F7123

## (undated) RX ORDER — ACETAMINOPHEN 325 MG/1
TABLET ORAL
Status: DISPENSED
Start: 2022-03-16

## (undated) RX ORDER — FENTANYL CITRATE 50 UG/ML
INJECTION, SOLUTION INTRAMUSCULAR; INTRAVENOUS
Status: DISPENSED
Start: 2022-03-16

## (undated) RX ORDER — CHLORHEXIDINE GLUCONATE ORAL RINSE 1.2 MG/ML
SOLUTION DENTAL
Status: DISPENSED
Start: 2022-03-16